# Patient Record
Sex: FEMALE
[De-identification: names, ages, dates, MRNs, and addresses within clinical notes are randomized per-mention and may not be internally consistent; named-entity substitution may affect disease eponyms.]

---

## 2022-11-13 ENCOUNTER — NURSE TRIAGE (OUTPATIENT)
Dept: OTHER | Facility: CLINIC | Age: 47
End: 2022-11-13

## 2022-11-13 NOTE — TELEPHONE ENCOUNTER
Location of patient: [place]    Subjective: Caller states \"I'm dealing with some heavy menstrual period. I've been on my eroid since the first week of Sept. Hyst scheduled for 3/10/23. I had an appt on Friday afternoon with gyn. Bleeding was better but still going on. She did an exam but things are going down hill. I had significant bleeding yesterday with clotting size of golf balls. It is a little better today but I am using a tampon, pad and two pair of underwear. The blood is bright red and having pretty significant cramping which hadn't been an issue. I feel like I am dehydrated. \"    Current Symptoms: abnormal vaginal bleeding    Onset: 2 months ago; worsening    Associated Symptoms:  lower abdominal cramping    Pain Severity: 7/10; cramping; waxing and waning    Temperature: NA     What has been tried: OTC tylenol and ibuprofen    LMP:  bleeding since the first week of Sept  Pregnant: No    Recommended disposition: See HCP within 4 Hours (or PCP triage)    Care advice provided, patient verbalizes understanding; denies any other questions or concerns; instructed to call back for any new or worsening symptoms. Patient/caller agrees to proceed to nearest [facility] or ED if [facility] is not available    This triage is a result of a call to [address] [place] [address]. Please do not respond to the triage nurse through this encounter. Any subsequent communication should be directly with the patient.     Reason for Disposition   MODERATE vaginal bleeding (e.g., soaking 1 pad or tampon per hour and present > 6 hours; 1 menstrual cup every 6 hours)    Protocols used: Vaginal Bleeding - Abnormal-ADULT-